# Patient Record
Sex: MALE | Race: WHITE | NOT HISPANIC OR LATINO | Employment: FULL TIME | URBAN - METROPOLITAN AREA
[De-identification: names, ages, dates, MRNs, and addresses within clinical notes are randomized per-mention and may not be internally consistent; named-entity substitution may affect disease eponyms.]

---

## 2024-01-20 DIAGNOSIS — K21.9 GASTRO-ESOPHAGEAL REFLUX DISEASE WITHOUT ESOPHAGITIS: ICD-10-CM

## 2024-01-22 RX ORDER — OMEPRAZOLE 20 MG/1
20 CAPSULE, DELAYED RELEASE ORAL DAILY
Qty: 90 CAPSULE | Refills: 1 | Status: SHIPPED | OUTPATIENT
Start: 2024-01-22

## 2024-04-29 ENCOUNTER — OFFICE VISIT (OUTPATIENT)
Dept: PODIATRY | Facility: CLINIC | Age: 29
End: 2024-04-29
Payer: COMMERCIAL

## 2024-04-29 DIAGNOSIS — S92.412A CLOSED FRACTURE OF PROXIMAL PHALANX OF LEFT GREAT TOE, INITIAL ENCOUNTER: Primary | ICD-10-CM

## 2024-04-29 DIAGNOSIS — M79.672 LEFT FOOT PAIN: ICD-10-CM

## 2024-04-29 PROCEDURE — L4361 PNEUMA/VAC WALK BOOT PRE OTS: HCPCS | Performed by: PODIATRIST

## 2024-04-29 PROCEDURE — 99202 OFFICE O/P NEW SF 15 MIN: CPT | Performed by: PODIATRIST

## 2024-04-29 NOTE — PROGRESS NOTES
CC: fracture left great toe    HPI:  Patient seen for new pt visit for fracture left great toe, happened last week, cannot remember any trauma, went to ER, xrays done, given surgical shoe, still swollen and painful.    PCP: Dr. Conteh  Last visit: 1-22-24     PMH  No past medical history on file.  MEDS    Current Outpatient Medications:     omeprazole (PriLOSEC) 20 mg DR capsule, TAKE 1 CAPSULE BY MOUTH EVERY DAY, Disp: 90 capsule, Rfl: 1  Allergies  Not on File  Social History     Socioeconomic History    Marital status: Single     Spouse name: Not on file    Number of children: Not on file    Years of education: Not on file    Highest education level: Not on file   Occupational History    Not on file   Tobacco Use    Smoking status: Not on file    Smokeless tobacco: Not on file   Substance and Sexual Activity    Alcohol use: Not on file    Drug use: Not on file    Sexual activity: Not on file   Other Topics Concern    Not on file   Social History Narrative    Not on file     Social Determinants of Health     Financial Resource Strain: Not on file   Food Insecurity: Not on file   Transportation Needs: Not on file   Physical Activity: Not on file   Stress: Not on file   Social Connections: Not on file   Intimate Partner Violence: Not on file   Housing Stability: Not on file     No family history on file.  No past surgical history on file.    REVIEW OF SYSTEMS    + as noted above in HPI.       Physical examination:   On General Observation: Patient is a pleasant, cooperative, well developed 29 y.o.  adult male. The patient is alert and oriented to time, place and person. Patient has normal affect and mood.  There were no vitals taken for this visit.    Vascular:  DP and PT pulses are 2/4 b/l.  Mild left foot edema noted. no varicosities b/l.  CFT  5 seconds to all digits bilateral.  Skin temperature is warm to warm from proximal to distal bilateral.      Muscular: Strength is 5/5 b/l.  Motor strength is normal and  symmetric proximally, as well as distally, in all four extremities. Good mobility of all extremities.  Tenderness to left hallux proximal phalanx.     Neuro:  Proprioception present.   Sensation to vibration is  intact. Protective sensation present  at all pedal sites via Phelps Jeanette 5.07 monofilament bilateral.  Light touch present bilateral.     Derm:  No rashes, lesions, or ecchymosis.     Ortho:  Anatomic alignment is present left hallux, no crepitus to rom, pain is present lateral base of the proximal phalanx.    Xrays:    ASSESSMENT:    Fracture left hallux proximal phalanx   Pain left foot.    DATE OF EXAM: Apr 24 2024  7:57AM      ANX   [id number]  -  XR FOOT 3V AP/LAT/OBL LT  / ACCESSION #  [id number]    PROCEDURE REASON: Foot trauma, no prior imaging        * * * * Physician Interpretation * * * *     3 views of the left foot, 04/24/2024.    Reason for examination: Pain after falling.  Pain primarily in the region  of the first digit.    COMPARISON: None available.    FINDINGS: There appears to be a cortical defect in the base of the first  proximal phalanx noted on the AP view.  Defect is associated with the  lateral aspect of the bone.  Findings suggest the presence of a  nondisplaced avulsion.  Otherwise, visualized osseous structures appear  intact.  There is a lucency projecting over the first proximal phalanx on  the lateral view which is felt to likely be artifactual in nature.  Exam End: 04/24/24 07:57    Specimen Collected: 04/24/24 07:57 Last Resulted: 04/24/24 08:09   Received From: [facility]         PLAN:   Consult  A comprehensive history and physical examination were preformed. The patient was educated on clinical findings, diagnosis and treatment plans. Patient understands all that has been explained and all questions were answered to apparent satisfaction.   -Reviewed xrays with pt and treatment options, start price, budding splinting, nsaids as needed, dispensed low profile pneumatic  cam walker size M, cut and trimmed to fit pt, follow up 4 week serial xrays.        CARTER WilsonM